# Patient Record
Sex: MALE | Employment: FULL TIME | URBAN - METROPOLITAN AREA
[De-identification: names, ages, dates, MRNs, and addresses within clinical notes are randomized per-mention and may not be internally consistent; named-entity substitution may affect disease eponyms.]

---

## 2017-04-21 PROBLEM — Z80.0 FAMILY HISTORY OF COLON CANCER: Status: ACTIVE | Noted: 2017-04-21

## 2018-02-02 ENCOUNTER — OFFICE VISIT (OUTPATIENT)
Dept: INTERNAL MEDICINE CLINIC | Facility: CLINIC | Age: 43
End: 2018-02-02

## 2018-02-02 VITALS
HEIGHT: 74 IN | TEMPERATURE: 98 F | BODY MASS INDEX: 22.72 KG/M2 | WEIGHT: 177 LBS | HEART RATE: 60 BPM | OXYGEN SATURATION: 98 % | DIASTOLIC BLOOD PRESSURE: 66 MMHG | SYSTOLIC BLOOD PRESSURE: 106 MMHG

## 2018-02-02 DIAGNOSIS — Z80.0 FAMILY HISTORY OF COLON CANCER: ICD-10-CM

## 2018-02-02 DIAGNOSIS — Z00.00 ANNUAL PHYSICAL EXAM: Primary | ICD-10-CM

## 2018-02-02 DIAGNOSIS — Z80.8 FAMILY HISTORY OF SKIN CANCER: ICD-10-CM

## 2018-02-02 LAB
OCCULT BLOOD, STOOL 1: NEGATIVE
PERFORMANCE MONITORS CORRECT (YES/NO): YES YES/NO

## 2018-02-02 PROCEDURE — 82272 OCCULT BLD FECES 1-3 TESTS: CPT | Performed by: INTERNAL MEDICINE

## 2018-02-02 PROCEDURE — 99396 PREV VISIT EST AGE 40-64: CPT | Performed by: INTERNAL MEDICINE

## 2018-02-02 NOTE — PROGRESS NOTES
Farrukh Sofia is a 38year old male who presents for a complete physical exam.   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 Disp:  Rfl:       History reviewed. No pertinent past medical history.    Past Surgical History:  4/17: COLONOSCOPY  4/21/2017: COLONOSCOPY N/A      Comment: Procedure: COLONOSCOPY, POSSIBLE BIOPSY, Stool is brown and is negative for Occult blood. Prostate is normal with no palpable nodules  MUSCULOSKELETAL: back is not tender. No pain or swelling of legs  EXTREMITIES:No edema. All peripheral pulses are intact.   NEURO:Alert and oriented, CN are in evaluation.       Aba Jacome MD  2/2/2018  5:55 PM        General Health                                                   Functional Ability                                                  Hearing Problems?: No     Functional Status     Hearing Pr

## 2019-03-11 NOTE — PATIENT INSTRUCTIONS
1.  Patient is to continue his current diet, medication and activity. 2.  I will obtain blood tests and urinalysis on the patient in the near future. 3.  I will refer the patient see Dr. Enrique Laws for dermatology evaluation.   4.  I will plan see the pat 58.4